# Patient Record
Sex: FEMALE | Race: WHITE | URBAN - METROPOLITAN AREA
[De-identification: names, ages, dates, MRNs, and addresses within clinical notes are randomized per-mention and may not be internally consistent; named-entity substitution may affect disease eponyms.]

---

## 2017-02-23 RX ORDER — LISINOPRIL 10 MG/1
TABLET ORAL
Qty: 30 TABLET | Refills: 1 | Status: SHIPPED | OUTPATIENT
Start: 2017-02-23 | End: 2017-05-01

## 2017-02-23 NOTE — TELEPHONE ENCOUNTER
No future appointments.   Last Appointment: 11/11/2016, 11:15 AM, Office visit extensive 25 and over, Larry Ennis MD, Saud Mortensen    Return to clinic as needed

## 2017-03-25 RX ORDER — SIMVASTATIN 10 MG
TABLET ORAL
Qty: 30 TABLET | Refills: 0 | OUTPATIENT
Start: 2017-03-25

## 2017-03-25 NOTE — TELEPHONE ENCOUNTER
No future appointments.   Last Appointment: 11/11/2016, 11:15 AM, Office visit extensive 25 and over, Otilia Ledezma MD, Salvatore Polo    Return to clinic as needed

## 2017-04-06 ENCOUNTER — OFFICE VISIT (OUTPATIENT)
Dept: FAMILY MEDICINE CLINIC | Facility: CLINIC | Age: 47
End: 2017-04-06

## 2017-04-06 VITALS
HEART RATE: 78 BPM | WEIGHT: 196.63 LBS | RESPIRATION RATE: 20 BRPM | DIASTOLIC BLOOD PRESSURE: 88 MMHG | TEMPERATURE: 98 F | SYSTOLIC BLOOD PRESSURE: 120 MMHG

## 2017-04-06 DIAGNOSIS — E11.8 UNCONTROLLED TYPE 2 DIABETES MELLITUS WITH COMPLICATION, WITH LONG-TERM CURRENT USE OF INSULIN (HCC): Primary | ICD-10-CM

## 2017-04-06 DIAGNOSIS — Z79.4 UNCONTROLLED TYPE 2 DIABETES MELLITUS WITH COMPLICATION, WITH LONG-TERM CURRENT USE OF INSULIN (HCC): Primary | ICD-10-CM

## 2017-04-06 DIAGNOSIS — E11.65 UNCONTROLLED TYPE 2 DIABETES MELLITUS WITH COMPLICATION, WITH LONG-TERM CURRENT USE OF INSULIN (HCC): Primary | ICD-10-CM

## 2017-04-06 DIAGNOSIS — H60.391 INFECTIVE OTITIS EXTERNA, RIGHT: ICD-10-CM

## 2017-04-06 PROCEDURE — 99214 OFFICE O/P EST MOD 30 MIN: CPT | Performed by: FAMILY MEDICINE

## 2017-04-06 RX ORDER — CIPROFLOXACIN AND DEXAMETHASONE 3; 1 MG/ML; MG/ML
4 SUSPENSION/ DROPS AURICULAR (OTIC) 2 TIMES DAILY
Qty: 1 BOTTLE | Refills: 1 | Status: SHIPPED | OUTPATIENT
Start: 2017-04-06

## 2017-04-06 RX ORDER — RANITIDINE 150 MG/1
150 TABLET ORAL 2 TIMES DAILY
COMMUNITY
Start: 2016-10-13

## 2017-04-06 RX ORDER — ASPIRIN 81 MG/1
81 TABLET, CHEWABLE ORAL DAILY
COMMUNITY
Start: 2016-10-02

## 2017-04-06 RX ORDER — CLINDAMYCIN PHOSPHATE 10 MG/ML
LOTION TOPICAL
COMMUNITY
Start: 2016-11-11

## 2017-04-06 RX ORDER — OMEPRAZOLE 20 MG/1
20 CAPSULE, DELAYED RELEASE ORAL
COMMUNITY

## 2017-04-06 RX ORDER — FEXOFENADINE HCL 180 MG/1
180 TABLET ORAL DAILY
COMMUNITY
Start: 2016-10-02

## 2017-04-06 RX ORDER — SIMVASTATIN 10 MG
10 TABLET ORAL DAILY
Qty: 90 TABLET | Refills: 0 | Status: SHIPPED | OUTPATIENT
Start: 2017-04-06

## 2017-04-06 RX ORDER — ALBUTEROL SULFATE 90 UG/1
AEROSOL, METERED RESPIRATORY (INHALATION)
COMMUNITY
Start: 2016-10-26

## 2017-04-06 RX ORDER — SIMVASTATIN 10 MG
10 TABLET ORAL DAILY
COMMUNITY
Start: 2016-10-02 | End: 2017-04-06

## 2017-04-06 RX ORDER — CLONAZEPAM 0.5 MG/1
0.5 TABLET ORAL AS NEEDED
COMMUNITY
Start: 2016-10-13 | End: 2017-04-27

## 2017-04-06 RX ORDER — FLUTICASONE PROPIONATE 50 MCG
SPRAY, SUSPENSION (ML) NASAL
COMMUNITY
Start: 2016-10-02

## 2017-04-06 NOTE — PATIENT INSTRUCTIONS
Continue current medications   Start using tresiba. Continue to monitor glucose level, call if glucose level less than 70 or more than 300.     Advice low carb in diet, AVOID FOOD WITH HIGH GLYCEMIC INDEX, have smaller portion meal, no juice or soda, don'

## 2017-04-06 NOTE — PROGRESS NOTES
[facility]  PROGRESS NOTE  Chief Complaint:   Patient presents with:  Diabetes  Medication Request      HPI:   This is a 40year old female presents for follow-up on her diabetes.   She has been tolerating her medications well and denies severe  Sulfa Antibiotics           Comment:Other reaction(s): itching, hot skin, rash severe  Victoza                     Comment:Other reaction(s): seizure  Current Meds:    Current Outpatient Prescriptions:  Albuterol Sulfate HFA (PROAIR HFA) 108 (90 Ba sneezing, congestion, runny nose or sore throat. See HPI  INTEGUMENTARY:  Denies rashes, itching, skin lesion, or excessive skin dryness.   CARDIOVASCULAR:  Denies chest pain, chest pressure, chest discomfort, palpitations, edema, dyspnea on exertion or at lymphadenopathy. MUSCULOSKELETAL: Normal ROM, no joint pain, or muscle weakness in all extremity. NEUROLOGICAL:  No deficit, normal gait, strength and tone, sensory intact, normal reflexes.   PSYCHIATRIC: Alert and oriented x 3; affect appropriate, no de education done. Outcome: Patient verbalizes understanding. Patient is notified to call with any questions, complications, allergies, or worsening or changing symptoms.   Patient is to call with any side effects or complications from the treatments as a re

## 2017-04-07 ENCOUNTER — TELEPHONE (OUTPATIENT)
Dept: FAMILY MEDICINE CLINIC | Facility: CLINIC | Age: 47
End: 2017-04-07

## 2017-04-07 RX ORDER — PEN NEEDLE, DIABETIC 31 G X1/4"
NEEDLE, DISPOSABLE MISCELLANEOUS
Qty: 150 EACH | Refills: 11 | Status: SHIPPED | OUTPATIENT
Start: 2017-04-07

## 2017-04-07 NOTE — TELEPHONE ENCOUNTER
Patient is needing needles for Seleta [place] that prescribed yesterday. Dr Gilson Padilla please advise. Thank you.

## 2017-04-27 ENCOUNTER — TELEPHONE (OUTPATIENT)
Dept: FAMILY MEDICINE CLINIC | Facility: CLINIC | Age: 47
End: 2017-04-27

## 2017-04-27 RX ORDER — CLONAZEPAM 0.5 MG/1
TABLET ORAL
Qty: 30 TABLET | Refills: 0 | Status: SHIPPED | OUTPATIENT
Start: 2017-04-27

## 2017-04-27 NOTE — TELEPHONE ENCOUNTER
Needs refill for klonopin- will  at [address] [facility] in [place] - understands pharmacy needs to be called for all future refills

## 2017-05-01 RX ORDER — LISINOPRIL 10 MG/1
TABLET ORAL
Qty: 30 TABLET | Refills: 2 | Status: SHIPPED | OUTPATIENT
Start: 2017-05-01

## 2017-05-01 NOTE — TELEPHONE ENCOUNTER
Last OV:  4/6/2017. Return in about 3 months (around 7/6/2017). No future appointments.   Joyce Giron, 05/01/2017, 8:04 AM

## 2017-06-20 ENCOUNTER — TELEPHONE (OUTPATIENT)
Dept: FAMILY MEDICINE CLINIC | Facility: CLINIC | Age: 47
End: 2017-06-20

## 2017-06-21 NOTE — TELEPHONE ENCOUNTER
Patient job has moved her to [facility] to [facility] [employer]. Patient is wondering if Dr Bessie Hines had any recommendations on a primary care doctor to see there?  Patient states that she would love to stay with Dr Bessie Hines but she at this time

## 2017-06-21 NOTE — TELEPHONE ENCOUNTER
I do not know any PCP in [place] [facility] personally, so I am unable to give any recommendation. 1 more prescription has been sent.

## 2017-06-27 ENCOUNTER — TELEPHONE (OUTPATIENT)
Dept: FAMILY MEDICINE CLINIC | Facility: CLINIC | Age: 47
End: 2017-06-27

## 2017-06-27 NOTE — TELEPHONE ENCOUNTER
Agree with below. If patient tolerating metformin 500 mg twice daily well; in 1 week will increase metformin to 1000 mg twice daily for optimal usage in controlling diabetes. Patient to call if blood sugar greater than 300.

## 2017-06-27 NOTE — TELEPHONE ENCOUNTER
Patient's insurance has denied the tresiba flextouch. Insurance company/pharmacy gave us options on which medications are covered under the policy the patient has. Page Comas flexpen or tojeo solostar.  Patient confirmed that she hasnt tried any of th

## 2017-06-27 NOTE — TELEPHONE ENCOUNTER
Informed patient of the follow below. Patient states that she is happy that Angela doesn't feel comfortable with any other the medications since she had such bad side effects to the the victoza.  Patient currently doesn't have a PCP but is going to try and ge

## 2017-06-27 NOTE — TELEPHONE ENCOUNTER
Levemir , toujeo, and tresiba showing conflict in allergies to Victoza, Victoza induced seizures--demonstrating the patient may have similar reaction to these medications.   Has taken patient taken any of these in past?

## 2017-06-28 ENCOUNTER — TELEPHONE (OUTPATIENT)
Dept: FAMILY MEDICINE CLINIC | Facility: CLINIC | Age: 47
End: 2017-06-28

## 2017-06-28 NOTE — TELEPHONE ENCOUNTER
Dr. Lyndsey Little on vacation. A 30 day supply was sent to pharmacy in [place]. Patient is due for appointment in July. Please let patient know. Thank you.  Modesta Swartz, 06/28/17, 12:09 PM

## 2017-06-28 NOTE — TELEPHONE ENCOUNTER
Called patient to verify pharmacy, wants it sent to [facility] [place]     Patient states she will run out in 5 days   Please advise

## 2018-08-08 ENCOUNTER — PATIENT OUTREACH (OUTPATIENT)
Dept: FAMILY MEDICINE CLINIC | Facility: CLINIC | Age: 48
End: 2018-08-08

## 2018-08-08 NOTE — PROGRESS NOTES
Contacting patient to schedule a follow up Diabetic exam. Patient was last seen on 4/2017 and last A1C unknown. Due for a follow up 7/6/17. Left message to contact office.

## 2023-01-23 ENCOUNTER — RX ONLY (OUTPATIENT)
Age: 53
Setting detail: RX ONLY
End: 2023-01-23

## 2023-03-01 ENCOUNTER — APPOINTMENT (RX ONLY)
Dept: URBAN - METROPOLITAN AREA CLINIC 146 | Facility: CLINIC | Age: 53
Setting detail: DERMATOLOGY
End: 2023-03-01

## 2023-03-01 DIAGNOSIS — L20.89 OTHER ATOPIC DERMATITIS: ICD-10-CM

## 2023-03-01 DIAGNOSIS — L24 IRRITANT CONTACT DERMATITIS: ICD-10-CM

## 2023-03-01 PROBLEM — L24.9 IRRITANT CONTACT DERMATITIS, UNSPECIFIED CAUSE: Status: ACTIVE | Noted: 2023-03-01

## 2023-03-01 PROCEDURE — ? PRESCRIPTION

## 2023-03-01 PROCEDURE — 99213 OFFICE O/P EST LOW 20 MIN: CPT

## 2023-03-01 PROCEDURE — ? COUNSELING

## 2023-03-01 RX ORDER — NYSTATIN CREAM 100000 [USP'U]/G
CREAM TOPICAL
Qty: 30 | Refills: 4 | Status: ERX | COMMUNITY
Start: 2023-03-01

## 2023-03-01 RX ORDER — TRIAMCINOLONE ACETONIDE 1 MG/G
CREAM TOPICAL
Qty: 30 | Refills: 0 | Status: ERX | COMMUNITY
Start: 2023-03-01

## 2023-03-01 RX ADMIN — NYSTATIN CREAM: 100000 CREAM TOPICAL at 00:00

## 2023-03-01 RX ADMIN — TRIAMCINOLONE ACETONIDE: 1 CREAM TOPICAL at 00:00

## 2023-03-01 ASSESSMENT — LOCATION SIMPLE DESCRIPTION DERM
LOCATION SIMPLE: LEFT THIGH
LOCATION SIMPLE: LEFT FOREARM
LOCATION SIMPLE: RIGHT THIGH

## 2023-03-01 ASSESSMENT — LOCATION DETAILED DESCRIPTION DERM
LOCATION DETAILED: LEFT VENTRAL PROXIMAL FOREARM
LOCATION DETAILED: LEFT ANTERIOR PROXIMAL THIGH
LOCATION DETAILED: RIGHT ANTERIOR PROXIMAL THIGH

## 2023-03-01 ASSESSMENT — LOCATION ZONE DERM
LOCATION ZONE: LEG
LOCATION ZONE: ARM

## 2023-03-09 ENCOUNTER — APPOINTMENT (RX ONLY)
Dept: URBAN - METROPOLITAN AREA CLINIC 146 | Facility: CLINIC | Age: 53
Setting detail: DERMATOLOGY
End: 2023-03-09

## 2023-03-09 DIAGNOSIS — L24 IRRITANT CONTACT DERMATITIS: ICD-10-CM

## 2023-03-09 DIAGNOSIS — D485 NEOPLASM OF UNCERTAIN BEHAVIOR OF SKIN: ICD-10-CM

## 2023-03-09 PROBLEM — D48.5 NEOPLASM OF UNCERTAIN BEHAVIOR OF SKIN: Status: ACTIVE | Noted: 2023-03-09

## 2023-03-09 PROBLEM — L24.9 IRRITANT CONTACT DERMATITIS, UNSPECIFIED CAUSE: Status: ACTIVE | Noted: 2023-03-09

## 2023-03-09 PROCEDURE — 11104 PUNCH BX SKIN SINGLE LESION: CPT

## 2023-03-09 PROCEDURE — 99213 OFFICE O/P EST LOW 20 MIN: CPT | Mod: 25

## 2023-03-09 PROCEDURE — ? COUNSELING

## 2023-03-09 PROCEDURE — ? PRESCRIPTION

## 2023-03-09 PROCEDURE — ? BIOPSY BY PUNCH METHOD

## 2023-03-09 RX ORDER — MEDPURA ZINC OXIDE 20% 20 G/100G
OINTMENT TOPICAL
Qty: 85 | Refills: 2 | Status: ERX | COMMUNITY
Start: 2023-03-09

## 2023-03-09 RX ORDER — BETAMETHASONE DIPROPIONATE 0.5 MG/G
OINTMENT, AUGMENTED TOPICAL
Qty: 50 | Refills: 2 | Status: ERX | COMMUNITY
Start: 2023-03-09

## 2023-03-09 RX ADMIN — BETAMETHASONE DIPROPIONATE: 0.5 OINTMENT, AUGMENTED TOPICAL at 00:00

## 2023-03-09 RX ADMIN — MEDPURA ZINC OXIDE 20%: 20 OINTMENT TOPICAL at 00:00

## 2023-03-09 ASSESSMENT — LOCATION SIMPLE DESCRIPTION DERM
LOCATION SIMPLE: LEFT THIGH
LOCATION SIMPLE: RIGHT THIGH

## 2023-03-09 ASSESSMENT — LOCATION DETAILED DESCRIPTION DERM
LOCATION DETAILED: RIGHT ANTERIOR PROXIMAL THIGH
LOCATION DETAILED: LEFT ANTERIOR PROXIMAL THIGH

## 2023-03-09 ASSESSMENT — LOCATION ZONE DERM: LOCATION ZONE: LEG

## 2023-03-09 NOTE — PROCEDURE: BIOPSY BY PUNCH METHOD
Detail Level: Detailed
Was A Bandage Applied: Yes
Punch Size In Mm: 3
Size Of Lesion In Cm (Optional): 0
Depth Of Punch Biopsy: dermis
Biopsy Type: H and E
Anesthesia Type: 1% lidocaine with epinephrine
Anesthesia Volume In Cc (Will Not Render If 0): 0.5
Hemostasis: None
Epidermal Sutures: 4-0 Ethilon
Wound Care: Petrolatum
Dressing: bandage
Suture Removal: 14 days
Patient Will Remove Sutures At Home?: No
Lab: -[id number]
Consent: Written consent was obtained and risks were reviewed including but not limited to scarring, infection, bleeding, scabbing, incomplete removal, nerve damage and allergy to anesthesia.
Post-Care Instructions: I reviewed with the patient in detail post-care instructions. Patient is to keep the biopsy site dry overnight, and then apply bacitracin twice daily until healed. Patient may apply hydrogen peroxide soaks to remove any crusting.
Home Suture Removal Text: Patient was provided a home suture removal kit and will remove their sutures at home. If they have any questions or difficulties they will call the office.
Notification Instructions: Patient will be notified of biopsy results. However, patient instructed to call the office if not contacted within 2 weeks.
Billing Type: [employer]
Information: Selecting Yes will display possible errors in your note based on the variables you have selected. This validation is only offered as a suggestion for you. PLEASE NOTE THAT THE VALIDATION TEXT WILL BE REMOVED WHEN YOU FINALIZE YOUR NOTE. IF YOU WANT TO FAX A PRELIMINARY NOTE YOU WILL NEED TO TOGGLE THIS TO 'NO' IF YOU DO NOT WANT IT IN YOUR FAXED NOTE.

## 2023-03-29 ENCOUNTER — APPOINTMENT (RX ONLY)
Dept: URBAN - METROPOLITAN AREA CLINIC 146 | Facility: CLINIC | Age: 53
Setting detail: DERMATOLOGY
End: 2023-03-29

## 2023-03-29 DIAGNOSIS — Q828 OTHER SPECIFIED ANOMALIES OF SKIN: ICD-10-CM

## 2023-03-29 DIAGNOSIS — Q819 OTHER SPECIFIED ANOMALIES OF SKIN: ICD-10-CM

## 2023-03-29 DIAGNOSIS — L20.89 OTHER ATOPIC DERMATITIS: ICD-10-CM

## 2023-03-29 DIAGNOSIS — Q826 OTHER SPECIFIED ANOMALIES OF SKIN: ICD-10-CM

## 2023-03-29 PROBLEM — L85.8 OTHER SPECIFIED EPIDERMAL THICKENING: Status: ACTIVE | Noted: 2023-03-29

## 2023-03-29 PROBLEM — L20.84 INTRINSIC (ALLERGIC) ECZEMA: Status: ACTIVE | Noted: 2023-03-29

## 2023-03-29 PROCEDURE — 99213 OFFICE O/P EST LOW 20 MIN: CPT

## 2023-03-29 PROCEDURE — ? ORDER FOR PATCH TESTING

## 2023-03-29 PROCEDURE — ? COUNSELING

## 2023-03-29 PROCEDURE — ? PRESCRIPTION

## 2023-03-29 RX ORDER — LORATADINE 10 MG/1
CAPSULE, LIQUID FILLED ORAL
Qty: 60 | Refills: 2 | Status: ERX | COMMUNITY
Start: 2023-03-29

## 2023-03-29 RX ORDER — HYDROXYZINE HYDROCHLORIDE 25 MG/1
TABLET, FILM COATED ORAL
Qty: 30 | Refills: 3 | Status: ERX | COMMUNITY
Start: 2023-03-29

## 2023-03-29 RX ADMIN — HYDROXYZINE HYDROCHLORIDE: 25 TABLET, FILM COATED ORAL at 00:00

## 2023-03-29 RX ADMIN — LORATADINE: 10 CAPSULE, LIQUID FILLED ORAL at 00:00

## 2023-03-29 ASSESSMENT — LOCATION SIMPLE DESCRIPTION DERM
LOCATION SIMPLE: RIGHT PRETIBIAL REGION
LOCATION SIMPLE: LEFT THIGH
LOCATION SIMPLE: RIGHT THIGH
LOCATION SIMPLE: RIGHT POSTERIOR UPPER ARM
LOCATION SIMPLE: RIGHT FOREARM
LOCATION SIMPLE: LEFT FOREARM
LOCATION SIMPLE: LEFT POSTERIOR UPPER ARM
LOCATION SIMPLE: LEFT PRETIBIAL REGION

## 2023-03-29 ASSESSMENT — LOCATION DETAILED DESCRIPTION DERM
LOCATION DETAILED: RIGHT PROXIMAL DORSAL FOREARM
LOCATION DETAILED: LEFT PROXIMAL PRETIBIAL REGION
LOCATION DETAILED: RIGHT PROXIMAL PRETIBIAL REGION
LOCATION DETAILED: RIGHT ANTERIOR PROXIMAL THIGH
LOCATION DETAILED: LEFT PROXIMAL POSTERIOR UPPER ARM
LOCATION DETAILED: LEFT ANTERIOR PROXIMAL THIGH
LOCATION DETAILED: RIGHT PROXIMAL POSTERIOR UPPER ARM
LOCATION DETAILED: LEFT PROXIMAL DORSAL FOREARM

## 2023-03-29 ASSESSMENT — LOCATION ZONE DERM
LOCATION ZONE: ARM
LOCATION ZONE: LEG

## 2023-03-29 NOTE — PROCEDURE: ORDER FOR PATCH TESTING
Counseling: I discussed the timing of the procedure and ensured the patient understands that this test requires multiple visits. No topical steroids applied should be applied to the patch testing location and no oral prednisone for two week prior to the test. While the patches are in place they should be kept dry which will limit bathing, swimming an exercise. I also explained that it is common for testing to be negative and this doesn't mean there isn't a allergic reaction occurring. During the testing itching is common.
Detail Level: Simple
Patch Test To Be Applied: [employer] 80 Series
Patch Test Reading Schedule: First Reading at 48 hours and Second Reading at 72 hours
Location Patches Should Be Applied: Back

## 2023-03-29 NOTE — PROCEDURE: COUNSELING
Cleanser Recommendations: Cerave
Moisturizer Recommendations: Vanicream
Detail Level: Simple
Patient Specific Counseling (Will Not Stick From Patient To Patient): Recommend using electric razor
Detail Level: Zone

## (undated) NOTE — LETTER
8/10/2018  Iona Taylor  [address] [facility] [id number]    We take each of our patient's health very seriously and the key to maintaining your health is an annual wellness exams.   Review of your medical records shows that it is time for your Diabetic f

## (undated) NOTE — MR AVS SNAPSHOT
[place] [address],  [address]  [phone]               Thank you for choosing us for your health care visit with Reena Romo MD.  We are glad to serve you and happy to provide you with this summary of yo Advice low carb in diet, AVOID FOOD WITH HIGH GLYCEMIC INDEX, have smaller portion meal, no juice or soda, don't eat late at night, exercise, weight loss. Use ear drops for ear infection. Return to clinic if no improvement.         Allergies as of Apr 0 Take 150 mg by mouth 2 (two) times daily. Commonly known as:  ZANTAC           simvastatin 10 MG Tabs   Take 1 tablet (10 mg total) by mouth daily.    Commonly known as:  ZOCOR           TRESIBA FLEXTOUCH 200 UNIT/ML Sopn   Generic drug:  Insulin Degludec EAT THESE FOODS MORE OFTEN: EAT THESE FOODS LESS OFTEN:   Make half your plate fruits and vegetables Highly refined, white starches including white bread, rice and pasta   Eat plenty of protein, keep the fat content low Sugars:  sodas and sports drinks, ca